# Patient Record
Sex: MALE | NOT HISPANIC OR LATINO | Employment: FULL TIME | URBAN - METROPOLITAN AREA
[De-identification: names, ages, dates, MRNs, and addresses within clinical notes are randomized per-mention and may not be internally consistent; named-entity substitution may affect disease eponyms.]

---

## 2023-07-12 ENCOUNTER — NON-PROVIDER VISIT (OUTPATIENT)
Dept: OCCUPATIONAL MEDICINE | Facility: CLINIC | Age: 23
End: 2023-07-12

## 2023-07-12 DIAGNOSIS — Z02.1 PRE-EMPLOYMENT DRUG SCREENING: ICD-10-CM

## 2023-07-12 LAB
AMP AMPHETAMINE: NORMAL
COC COCAINE: NORMAL
INT CON NEG: NORMAL
INT CON POS: NORMAL
MET METHAMPHETAMINES: NORMAL
OPI OPIATES: NORMAL
PCP PHENCYCLIDINE: NORMAL
POC DRUG COMMENT 753798-OCCUPATIONAL HEALTH: NORMAL
THC: NORMAL

## 2023-07-12 PROCEDURE — 80305 DRUG TEST PRSMV DIR OPT OBS: CPT | Performed by: NURSE PRACTITIONER

## 2023-07-18 ENCOUNTER — HOSPITAL ENCOUNTER (EMERGENCY)
Facility: MEDICAL CENTER | Age: 23
End: 2023-07-18
Attending: EMERGENCY MEDICINE
Payer: MEDICAID

## 2023-07-18 VITALS
WEIGHT: 180.78 LBS | RESPIRATION RATE: 16 BRPM | OXYGEN SATURATION: 95 % | DIASTOLIC BLOOD PRESSURE: 74 MMHG | TEMPERATURE: 98.1 F | BODY MASS INDEX: 23.2 KG/M2 | HEIGHT: 74 IN | HEART RATE: 71 BPM | SYSTOLIC BLOOD PRESSURE: 130 MMHG

## 2023-07-18 DIAGNOSIS — B34.9 VIRAL SYNDROME: ICD-10-CM

## 2023-07-18 LAB
FLUAV RNA SPEC QL NAA+PROBE: NEGATIVE
FLUBV RNA SPEC QL NAA+PROBE: NEGATIVE
RSV RNA SPEC QL NAA+PROBE: NEGATIVE
S PYO DNA SPEC NAA+PROBE: NOT DETECTED
SARS-COV-2 RNA RESP QL NAA+PROBE: NOTDETECTED
SPECIMEN SOURCE: NORMAL

## 2023-07-18 PROCEDURE — C9803 HOPD COVID-19 SPEC COLLECT: HCPCS | Performed by: EMERGENCY MEDICINE

## 2023-07-18 PROCEDURE — 0241U HCHG SARS-COV-2 COVID-19 NFCT DS RESP RNA 4 TRGT MIC: CPT

## 2023-07-18 PROCEDURE — 87651 STREP A DNA AMP PROBE: CPT

## 2023-07-18 PROCEDURE — A9270 NON-COVERED ITEM OR SERVICE: HCPCS | Performed by: EMERGENCY MEDICINE

## 2023-07-18 PROCEDURE — 99283 EMERGENCY DEPT VISIT LOW MDM: CPT

## 2023-07-18 PROCEDURE — 700102 HCHG RX REV CODE 250 W/ 637 OVERRIDE(OP): Performed by: EMERGENCY MEDICINE

## 2023-07-18 RX ORDER — IBUPROFEN 600 MG/1
600 TABLET ORAL ONCE
Status: COMPLETED | OUTPATIENT
Start: 2023-07-18 | End: 2023-07-18

## 2023-07-18 RX ADMIN — IBUPROFEN 600 MG: 600 TABLET, FILM COATED ORAL at 11:53

## 2023-07-18 ASSESSMENT — PAIN DESCRIPTION - PAIN TYPE: TYPE: ACUTE PAIN

## 2023-07-18 NOTE — ED PROVIDER NOTES
"ED Provider Note    CHIEF COMPLAINT  Chief Complaint   Patient presents with    Flu Like Symptoms     Pt c/o sore throat, cough and nasal congestion for that past 2 days. Pt denies fever.        EXTERNAL RECORDS REVIEWED  Outpatient Notes -patient was seen at occupational health 6 days ago for preemployment drug screening.    HPI/ROS  LIMITATION TO HISTORY   Select: : None  OUTSIDE HISTORIAN(S):  None    Duane Lutz is a 22 y.o. male who presents with a chief complaint of cough, sore throat, and rhinorrhea that has been ongoing for 2 days.  He has taken Tylenol without improvement.  He denies any fevers, chest pain, shortness of breath, nausea, vomiting, diarrhea.  He notes that his girlfriend had strep throat and is concerned that she gave it to him.    PAST MEDICAL HISTORY       SURGICAL HISTORY  patient denies any surgical history    FAMILY HISTORY  History reviewed. No pertinent family history.    SOCIAL HISTORY  Social History     Tobacco Use    Smoking status: Never    Smokeless tobacco: Never   Vaping Use    Vaping Use: Never used   Substance and Sexual Activity    Alcohol use: Yes     Comment: occasional    Drug use: Yes     Types: Inhaled     Comment: marijuana    Sexual activity: Not on file       CURRENT MEDICATIONS  Home Medications       Reviewed by Birgit Harris R.N. (Registered Nurse) on 07/18/23 at 1034  Med List Status: Not Addressed     Medication Last Dose Status        Patient Amador Taking any Medications                           ALLERGIES  No Known Allergies    PHYSICAL EXAM  VITAL SIGNS: /73   Pulse 80   Temp 36.9 °C (98.5 °F) (Temporal)   Resp 16   Ht 1.88 m (6' 2\")   Wt 82 kg (180 lb 12.4 oz)   SpO2 96%   BMI 23.21 kg/m²    Physical Exam  Vitals and nursing note reviewed.   Constitutional:       Appearance: Normal appearance.   HENT:      Head: Normocephalic and atraumatic.      Right Ear: External ear normal.      Left Ear: External ear normal.      Nose: Nose " normal.      Mouth/Throat:      Mouth: Mucous membranes are moist.      Pharynx: Oropharynx is clear.      Comments: Posterior oropharynx is moist and pink without tonsillar erythema, edema, exudates.  Uvula is midline.  No peritonsillar fullness.  No muffled voice.  Eyes:      Extraocular Movements: Extraocular movements intact.      Conjunctiva/sclera: Conjunctivae normal.      Pupils: Pupils are equal, round, and reactive to light.   Cardiovascular:      Rate and Rhythm: Normal rate and regular rhythm.   Pulmonary:      Effort: Pulmonary effort is normal.      Breath sounds: Normal breath sounds.   Musculoskeletal:         General: Normal range of motion.      Cervical back: Normal range of motion and neck supple.   Skin:     General: Skin is warm and dry.   Neurological:      General: No focal deficit present.      Mental Status: He is alert and oriented to person, place, and time.   Psychiatric:         Mood and Affect: Mood normal.         Behavior: Behavior normal.       DIAGNOSTIC STUDIES / PROCEDURES  LABS  Results for orders placed or performed during the hospital encounter of 07/18/23   Group A Strep by PCR    Specimen: Throat   Result Value Ref Range    Group A Strep by PCR Not Detected Not Detected   CoV-2, FLU A/B, and RSV by PCR (2-4 Hours [employer]) : Collect NP swab in VTM    Specimen: Respirate   Result Value Ref Range    Influenza virus A RNA Negative Negative    Influenza virus B, PCR Negative Negative    RSV, PCR Negative Negative    SARS-CoV-2 by PCR NotDetected     SARS-CoV-2 Source NP Swab      RADIOLOGY  Radiologist interpretation:   No orders to display     COURSE & MEDICAL DECISION MAKING    ED Observation Status? Yes; I am placing the patient in to an observation status due to a diagnostic uncertainty as well as therapeutic intensity. Patient placed in observation status at 1:07 PM, 7/18/2023.     Observation plan is as follows: Labs, medication    Upon Reevaluation, the patient's condition  has: Improved; and will be discharged.    Patient discharged from ED Observation status at 12:58 PM (Time) 7/18/2023 (Date).     INITIAL ASSESSMENT, COURSE AND PLAN  Care Narrative: This is a 22-year-old male who is here with a sore throat and runny nose as well as nonproductive cough that has been ongoing for the past 2 days.  Differential diagnosis includes, but is not limited to, viral syndrome, pneumonia, strep pharyngitis, mono.  Arrives afebrile with normal vital signs.  Appears well-hydrated and nontoxic.  Lungs are clear.  He has not had any fevers, chest pain, shortness of breath, or productive cough-clinically this does not represent pneumonia.  No obvious leg swelling, no cardiac murmurs or rubs.  Patient is tolerating his secretions without any difficulty.  No stridor.  Posterior oropharynx is widely patent with midline uvula and no peritonsillar fullness.  This is not a peritonsillar abscess.  Neck is supple and there is no muffling of the voice, low suspicion for etiology such as RPA.  Patient was given a dose of ibuprofen and strep test was obtained which was negative.  Viral swabs for flu/COVID/RSV were also negative.    Patient was reevaluated at bedside.  He is resting comfortably.  He has tolerated p.o. without any difficulty.  We discussed viral syndrome and that it is not amenable to antibiotics.  I have recommended symptomatic care.  He was given a work note.  I placed a referral to primary care on his behalf.  He was discharged in good and stable condition with strict return precautions.    ADDITIONAL PROBLEM LIST  None  DISPOSITION AND DISCUSSIONS  I have discussed management of the patient with the following physicians and NIR's: None    Discussion of management with other QHP or appropriate source(s): None     Escalation of care considered, and ultimately not performed:IV fluids, blood analysis, diagnostic imaging, and acute inpatient care management, however at this time, the patient is  most appropriate for outpatient management    Barriers to care at this time, including but not limited to: Patient does not have established PCP.     Decision tools and prescription drugs considered including, but not limited to:  None .    FINAL DIAGNOSIS  1. Viral syndrome      Electronically signed by: Joe Bradley M.D., 7/18/2023 11:08 AM

## 2023-07-18 NOTE — ED NOTES
Pt amb to room from lobby, c/o possible strep throat, stated it is scratchy and coughing.   Pt donned in mask.   Md to see pt.

## 2023-07-18 NOTE — ED TRIAGE NOTES
"Chief Complaint   Patient presents with    Flu Like Symptoms     Pt c/o sore throat, cough and nasal congestion for that past 2 days. Pt denies fever.      /73   Pulse 80   Temp 36.9 °C (98.5 °F) (Temporal)   Resp 16   Ht 1.88 m (6' 2\")   Wt 82 kg (180 lb 12.4 oz)   SpO2 96%   BMI 23.21 kg/m²     Pt ambulatory from lobby with steady gait. Pt concerned he may have gotten strep throat from his girlfriend. Pt also requesting help with establishing a PCP.     Pt is alert and oriented, speaking in full sentences, follows commands and responds appropriately to questions. Resp are even and unlabored.      Pt placed in lobby. Pt educated on triage process. Pt encouraged to alert staff for any changes.     Patient and staff wearing appropriate PPE.    "

## 2023-07-18 NOTE — DISCHARGE INSTRUCTIONS
You were seen in the ER for sore throat and runny nose as well as a cough.  Thankfully, your strep test is negative.  I suspect that your symptoms are due to a virus.  Viruses do not respond to antibiotics, instead I recommend rest, increase hydration, at least 8 ounces of clear liquids per hour, and over-the-counter cold/flu preparations per the directions on the bottle.  Your symptoms can last several weeks.  I have placed a referral to primary care in your behalf and gave you a list of local clinics we can establish, please call 1 today to schedule an appointment.  Return with new or worsening symptoms.  I hope you feel better soon!

## 2023-08-01 ENCOUNTER — TELEPHONE (OUTPATIENT)
Dept: HEALTH INFORMATION MANAGEMENT | Facility: OTHER | Age: 23
End: 2023-08-01
Payer: MEDICAID

## 2023-08-01 NOTE — TELEPHONE ENCOUNTER
OUTCOME: PT WILL CALL BACK AT A LATER TIME.    PLEASE TRANSFER TO [facility] [id number] WHEN PT RETURNS CALL.    ATTEMPT # 1.

## 2023-08-08 ENCOUNTER — TELEPHONE (OUTPATIENT)
Dept: HEALTH INFORMATION MANAGEMENT | Facility: OTHER | Age: 23
End: 2023-08-08
Payer: MEDICAID

## 2023-08-28 ENCOUNTER — APPOINTMENT (OUTPATIENT)
Dept: MEDICAL GROUP | Facility: CLINIC | Age: 23
End: 2023-08-28
Attending: EMERGENCY MEDICINE
Payer: MEDICAID

## 2023-08-29 ENCOUNTER — TELEPHONE (OUTPATIENT)
Dept: HEALTH INFORMATION MANAGEMENT | Facility: OTHER | Age: 23
End: 2023-08-29
Payer: MEDICAID

## 2023-10-08 ENCOUNTER — HOSPITAL ENCOUNTER (EMERGENCY)
Facility: MEDICAL CENTER | Age: 23
End: 2023-10-08
Attending: EMERGENCY MEDICINE
Payer: MEDICAID

## 2023-10-08 VITALS
DIASTOLIC BLOOD PRESSURE: 66 MMHG | RESPIRATION RATE: 16 BRPM | WEIGHT: 183.64 LBS | HEART RATE: 58 BPM | TEMPERATURE: 97.5 F | HEIGHT: 74 IN | SYSTOLIC BLOOD PRESSURE: 117 MMHG | OXYGEN SATURATION: 97 % | BODY MASS INDEX: 23.57 KG/M2

## 2023-10-08 DIAGNOSIS — K12.2 UVULITIS: ICD-10-CM

## 2023-10-08 DIAGNOSIS — R11.2 NAUSEA AND VOMITING, UNSPECIFIED VOMITING TYPE: ICD-10-CM

## 2023-10-08 PROCEDURE — 99282 EMERGENCY DEPT VISIT SF MDM: CPT

## 2023-10-08 RX ORDER — METHYLPREDNISOLONE 4 MG/1
TABLET ORAL
Qty: 1 EACH | Refills: 0 | Status: SHIPPED | OUTPATIENT
Start: 2023-10-08

## 2023-10-08 RX ORDER — ONDANSETRON 4 MG/1
4 TABLET, ORALLY DISINTEGRATING ORAL EVERY 6 HOURS PRN
Qty: 10 TABLET | Refills: 0 | Status: SHIPPED | OUTPATIENT
Start: 2023-10-08

## 2023-10-08 NOTE — ED NOTES
.Patient discharged per order. Oral and written discharge instructions reviewed. Medications sent to home pharmacy. New medications reviewed. All belongings accounted for and taken with patient. Questions answered, and patient agrees with discharge plan. Encouraged to follow up with PCP.

## 2023-10-08 NOTE — ED PROVIDER NOTES
"ER Provider Note    Scribed for Devaughn Montilla D.O. by Lucero Turner. 10/8/2023  12:17 PM    Primary Care Provider: None noted    CHIEF COMPLAINT  Chief Complaint   Patient presents with    N/V     Patient reports N/V at 3am this morning.     Sore Throat     Patient reports  sore throat and swelling this morning.      HPI/ROS  Duane Lutz is a 22 y.o. male who presents to the Emergency Department for vomiting onset this 8 hours ago. Patient states he woke up when he felt nauseous and had an episode of vomiting for about 15-20 minutes. Patient states he feels like when he \"suctions his mouth\" his \"uvula goes to the front\" and he gets nauseous. He notes an associated sore throat and throat swelling. Patient denies any abdominal pain or fevers. Patient has no known drug allergies.     ROS as per HPI.    PAST MEDICAL HISTORY  History reviewed. No pertinent past medical history.    SURGICAL HISTORY  History reviewed. No pertinent surgical history.    FAMILY HISTORY  History reviewed. No pertinent family history.    SOCIAL HISTORY   reports that he has never smoked. He has never used smokeless tobacco. He reports current alcohol use. He reports current drug use. Drug: Inhaled.    CURRENT MEDICATIONS  No current outpatient medications    ALLERGIES  Patient has no known allergies.    PHYSICAL EXAM  /66   Pulse (!) 56   Temp 36.4 °C (97.5 °F) (Temporal)   Resp 16   Ht 1.88 m (6' 2\")   Wt 83.3 kg (183 lb 10.3 oz)   SpO2 98%   BMI 23.58 kg/m²     General: No acute distress.  HENT: Normocephalic, Mucus membranes are moist. No pharyngeal erythema or exudates. Uvula is mildly erythematous with mild edema. No exudates. No airway compromise. Patient is able to tolerate secretions.   Chest: Lungs have even and unlabored respirations, Clear to auscultation.   Cardiovascular: Regular rate and regular rhythm, No peripheral cyanosis.  Abdomen: Non distended.  Neuro: Awake, Conversive, Able to relay recent " events.  Psychiatric: Calm and cooperative.     INITIAL ASSESSMENT  Patient vomited once this morning and now his uvula is mildly inflamed causing gag reflex. Patient has no pain. Will treat with Medrol and Zofran. His abdominal exam was normal and patient showed no signs of dehydration.     ED Observation Status? No; Patient does not meet criteria for ED Observation.     COURSE & MEDICAL DECISION MAKING     COURSE AND PLAN  12:17 PM - Patient was seen and evaluated at bedside. Patient presents to the ED for vomiting onset 8 hours ago. On exam patient presents with no pharyngeal erythema or exudates. Patient's uvula is mildly erythematous with mild edema and no exudates. After my exam, I discussed with the patient the plan of care, which includes treating the patient with outpatient medication for their symptoms. Patient will be discharged with Medrol Dose pack 4 mg and Zofran 4 mg. I then informed the patient of my plan for discharge, which includes strict return precautions for any new or worsening symptoms. Patient understands and verbalizes agreement to plan of care. Patient is comfortable going home at this time.       ED Summary: Patient vomited once this morning, since that he has been having what he describes as a gag reflex to his uvula.  On evaluation his uvula is inflamed, mild edema, there is no other swelling or exudates or concerns for infection.    He is afebrile.  He is treated with steroids and Benadryl for symptomatic relief, and antiemetics are given for prevention of further vomiting.    DISPOSITION AND DISCUSSIONS  Patient will be discharged home.    FOLLOW UP:  [facility] scheduling  Please call  to make an appoint with a next available practitioner for follow-up  In 1 day    OUTPATIENT MEDICATIONS:  Discharge Medication List as of 10/8/2023 12:35 PM        START taking these medications    Details   methylPREDNISolone (MEDROL DOSEPAK) 4 MG Tablet Therapy Pack Use as directed, Disp-1 Each,  R-0, Normal      ondansetron (ZOFRAN ODT) 4 MG TABLET DISPERSIBLE Take 1 Tablet by mouth every 6 hours as needed for Nausea/Vomiting., Disp-10 Tablet, R-0, Normal           FINAL DIAGNOSIS  1. Nausea and vomiting, unspecified vomiting type    2. Uvulitis      Lucero KEENE (Scribe), am scribing for, and in the presence of, Devaughn Montilla D.O..    Electronically signed by: Lucero Turner (Scribe), 10/8/2023    Devaughn KEENE D.O. personally performed the services described in this documentation, as scribed by Lucero Turner in my presence, and it is both accurate and complete.     The note accurately reflects work and decisions made by me.  Devaughn Montilla D.O.  10/8/2023  5:12 PM

## 2023-10-08 NOTE — ED TRIAGE NOTES
Chief Complaint   Patient presents with    N/V     Patient reports N/V at 3am this morning.     Sore Throat     Patient reports  sore throat and swelling this morning.

## 2023-10-08 NOTE — DISCHARGE INSTRUCTIONS
Take Benadryl every 6 hours to help reduce the swelling of the uvula, steroids will work in about 24 hours.  The gag reflex may be a bit hyperactive over the next 24 to 48 hours until the medication starts working.    Sucking on ice cubes and popsicles cold this can assist with this also.  Return if symptoms change or worsen, return if immediately if any trouble breathing.

## 2023-11-08 ENCOUNTER — OFFICE VISIT (OUTPATIENT)
Dept: URGENT CARE | Facility: CLINIC | Age: 23
End: 2023-11-08
Payer: MEDICAID

## 2023-11-08 VITALS
DIASTOLIC BLOOD PRESSURE: 62 MMHG | BODY MASS INDEX: 23.1 KG/M2 | HEART RATE: 68 BPM | OXYGEN SATURATION: 98 % | TEMPERATURE: 97.6 F | WEIGHT: 180 LBS | RESPIRATION RATE: 16 BRPM | SYSTOLIC BLOOD PRESSURE: 102 MMHG | HEIGHT: 74 IN

## 2023-11-08 DIAGNOSIS — J06.9 VIRAL URI: ICD-10-CM

## 2023-11-08 PROCEDURE — 3078F DIAST BP <80 MM HG: CPT | Performed by: PHYSICIAN ASSISTANT

## 2023-11-08 PROCEDURE — 99203 OFFICE O/P NEW LOW 30 MIN: CPT | Performed by: PHYSICIAN ASSISTANT

## 2023-11-08 PROCEDURE — 3074F SYST BP LT 130 MM HG: CPT | Performed by: PHYSICIAN ASSISTANT

## 2023-11-08 ASSESSMENT — ENCOUNTER SYMPTOMS
STRIDOR: 0
HEADACHES: 0
DIAPHORESIS: 0
MYALGIAS: 0
DIARRHEA: 0
WHEEZING: 0
SHORTNESS OF BREATH: 0
VOMITING: 0
CHILLS: 0
PALPITATIONS: 0
SINUS PAIN: 0
SPUTUM PRODUCTION: 0
DIZZINESS: 0
SORE THROAT: 0
COUGH: 0
FEVER: 0
ABDOMINAL PAIN: 0
NAUSEA: 0

## 2023-11-08 NOTE — PROGRESS NOTES
Subjective:     CHIEF COMPLAINT  Chief Complaint   Patient presents with    Nausea     X 1-2 wks, needs work note to clear back to work     Letter for School/Work       HPI  Duane Lutz is a very pleasant 23 y.o. male who presents to the clinic after recently recovering from URI.  Patient states he has missed the last couple weeks of work due to this.  Requesting a return to work note.  States symptoms included sinus congestion, fatigue, cough and nausea.  The symptoms have since fully improved.  No residual fevers.  No OTC medications are being used at this time.    REVIEW OF SYSTEMS  Review of Systems   Constitutional:  Negative for chills, diaphoresis, fever and malaise/fatigue.   HENT:  Negative for congestion, ear pain, sinus pain and sore throat.    Respiratory:  Negative for cough, sputum production, shortness of breath, wheezing and stridor.    Cardiovascular:  Negative for chest pain and palpitations.   Gastrointestinal:  Negative for abdominal pain, diarrhea, nausea and vomiting.   Musculoskeletal:  Negative for myalgias.   Neurological:  Negative for dizziness and headaches.       PAST MEDICAL HISTORY  There are no problems to display for this patient.      SURGICAL HISTORY  patient denies any surgical history    ALLERGIES  No Known Allergies    CURRENT MEDICATIONS  Home Medications       Reviewed by Ted Francis P.A.-C. (Physician Assistant) on 11/08/23 at 1126  Med List Status: <None>     Medication Last Dose Status   methylPREDNISolone (MEDROL DOSEPAK) 4 MG Tablet Therapy Pack Not Taking Active   ondansetron (ZOFRAN ODT) 4 MG TABLET DISPERSIBLE Not Taking Active                    SOCIAL HISTORY  Social History     Tobacco Use    Smoking status: Never    Smokeless tobacco: Never   Vaping Use    Vaping Use: Some days    Substances: THC, CBD, Flavoring   Substance and Sexual Activity    Alcohol use: Yes     Comment: occasional    Drug use: Yes     Types: Inhaled     Comment: marijuana    Sexual  "activity: Not on file       FAMILY HISTORY  History reviewed. No pertinent family history.       Objective:     VITAL SIGNS: /62   Pulse 68   Temp 36.4 °C (97.6 °F)   Resp 16   Ht 1.88 m (6' 2\")   Wt 81.6 kg (180 lb)   SpO2 98%   BMI 23.11 kg/m²     PHYSICAL EXAM  Physical Exam  Constitutional:       General: He is not in acute distress.     Appearance: Normal appearance. He is not ill-appearing, toxic-appearing or diaphoretic.   HENT:      Head: Normocephalic and atraumatic.      Right Ear: Tympanic membrane, ear canal and external ear normal.      Left Ear: Tympanic membrane, ear canal and external ear normal.      Nose: Nose normal. No congestion or rhinorrhea.      Mouth/Throat:      Mouth: Mucous membranes are moist.      Pharynx: No oropharyngeal exudate or posterior oropharyngeal erythema.   Eyes:      Conjunctiva/sclera: Conjunctivae normal.   Cardiovascular:      Rate and Rhythm: Normal rate and regular rhythm.      Pulses: Normal pulses.      Heart sounds: Normal heart sounds.   Pulmonary:      Effort: Pulmonary effort is normal.      Breath sounds: Normal breath sounds. No wheezing.   Musculoskeletal:      Cervical back: Normal range of motion. No muscular tenderness.   Lymphadenopathy:      Cervical: No cervical adenopathy.   Skin:     General: Skin is warm and dry.   Neurological:      Mental Status: He is alert.   Psychiatric:         Mood and Affect: Mood normal.         Thought Content: Thought content normal.         Assessment/Plan:     1. Viral URI      MDM/Comments:    Patient seems to have fully recovered from URI.  No findings concerning for any infectious or contagious etiology at this time.  Work note provided.    Differential diagnosis, natural history, supportive care, and indications for immediate follow-up discussed. All questions answered. Patient agrees with the plan of care.    Follow-up as needed if symptoms worsen or fail to improve to PCP, Urgent care or Emergency " Room.    I have personally reviewed prior external notes and test results pertinent to today's visit.  I have independently reviewed and interpreted all diagnostics ordered during this urgent care acute visit.   Discussed management options (risks,benefits, and alternatives to treatment). Pt expresses understanding and the treatment plan was agreed upon. Questions were encouraged and answered to pt's satisfaction.    Please note that this dictation was created using voice recognition software. I have made a reasonable attempt to correct obvious errors, but I expect that there are errors of grammar and possibly content that I did not discover before finalizing the note.

## 2023-11-08 NOTE — LETTER
November 8, 2023    To Whom It May Concern:         This is confirmation that Duane Lutz attended his scheduled appointment with Ted Francis P.A.-C. on 11/08/23.  Patient seen and evaluated in clinic today.  No findings concerning for any infectious or contagious etiology.  Cleared to return to work effective 11/8/2023.         If you have any questions please do not hesitate to call me at the phone number listed below.    Sincerely,          Ted Francis P.A.-C.  [phone]